# Patient Record
Sex: MALE | Race: WHITE | NOT HISPANIC OR LATINO | URBAN - METROPOLITAN AREA
[De-identification: names, ages, dates, MRNs, and addresses within clinical notes are randomized per-mention and may not be internally consistent; named-entity substitution may affect disease eponyms.]

---

## 2017-09-11 ENCOUNTER — FOLLOW UP ESTABLISHED (OUTPATIENT)
Dept: URBAN - METROPOLITAN AREA CLINIC 64 | Facility: CLINIC | Age: 47
End: 2017-09-11
Payer: COMMERCIAL

## 2017-09-11 DIAGNOSIS — H52.13 MYOPIA, BILATERAL: Primary | ICD-10-CM

## 2017-09-11 PROCEDURE — 92014 COMPRE OPH EXAM EST PT 1/>: CPT | Performed by: OPTOMETRIST

## 2017-09-11 PROCEDURE — 92015 DETERMINE REFRACTIVE STATE: CPT | Performed by: OPTOMETRIST

## 2017-09-11 ASSESSMENT — INTRAOCULAR PRESSURE
OS: 18
OD: 16

## 2017-09-11 ASSESSMENT — KERATOMETRY
OD: 43.19
OS: 43.38

## 2017-09-11 ASSESSMENT — VISUAL ACUITY
OS: 20/20
OD: 20/20

## 2019-01-29 ENCOUNTER — FOLLOW UP ESTABLISHED (OUTPATIENT)
Dept: URBAN - METROPOLITAN AREA CLINIC 64 | Facility: CLINIC | Age: 49
End: 2019-01-29
Payer: COMMERCIAL

## 2019-01-29 PROCEDURE — 92014 COMPRE OPH EXAM EST PT 1/>: CPT | Performed by: OPTOMETRIST

## 2019-01-29 PROCEDURE — 92015 DETERMINE REFRACTIVE STATE: CPT | Performed by: OPTOMETRIST

## 2019-01-29 ASSESSMENT — KERATOMETRY
OS: 43.35
OD: 43.33

## 2019-01-29 ASSESSMENT — VISUAL ACUITY
OD: 20/20
OS: 20/15

## 2019-01-29 ASSESSMENT — INTRAOCULAR PRESSURE
OS: 16
OD: 15

## 2022-04-05 ENCOUNTER — OFFICE VISIT (OUTPATIENT)
Dept: URBAN - METROPOLITAN AREA CLINIC 64 | Facility: CLINIC | Age: 52
End: 2022-04-05
Payer: COMMERCIAL

## 2022-04-05 DIAGNOSIS — H52.03 HYPERMETROPIA, BILATERAL: Primary | ICD-10-CM

## 2022-04-05 DIAGNOSIS — H18.593 OTHER HEREDITARY CORNEAL DYSTROPHIES: ICD-10-CM

## 2022-04-05 PROCEDURE — 92004 COMPRE OPH EXAM NEW PT 1/>: CPT | Performed by: OPTOMETRIST

## 2022-04-05 ASSESSMENT — KERATOMETRY
OD: 43.08
OS: 43.30

## 2022-04-05 ASSESSMENT — INTRAOCULAR PRESSURE
OD: 16
OS: 17

## 2022-04-05 ASSESSMENT — VISUAL ACUITY
OD: 20/20
OS: 20/20

## 2022-04-05 NOTE — IMPRESSION/PLAN
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 Plan: Trace EBMD OD. Hx of recent epiphora OD. Recommend he try OTC tears qid OD. RTC if epiphora persists and we can do D/I. Hx of trichiasis eyelid sx OD. No trichiasis today.

## 2023-04-06 ENCOUNTER — OFFICE VISIT (OUTPATIENT)
Dept: URBAN - METROPOLITAN AREA CLINIC 64 | Facility: CLINIC | Age: 53
End: 2023-04-06
Payer: COMMERCIAL

## 2023-04-06 DIAGNOSIS — H52.03 HYPERMETROPIA, BILATERAL: Primary | ICD-10-CM

## 2023-04-06 DIAGNOSIS — H25.13 AGE-RELATED NUCLEAR CATARACT, BILATERAL: ICD-10-CM

## 2023-04-06 PROCEDURE — 92014 COMPRE OPH EXAM EST PT 1/>: CPT | Performed by: OPTOMETRIST

## 2023-04-06 ASSESSMENT — KERATOMETRY
OS: 43.60
OD: 43.44

## 2023-04-06 ASSESSMENT — INTRAOCULAR PRESSURE
OD: 13
OS: 16

## 2023-04-06 ASSESSMENT — VISUAL ACUITY
OD: 20/20
OS: 20/20

## 2023-04-06 NOTE — IMPRESSION/PLAN
Impression: Age-related nuclear cataract, bilateral: H25.13. Plan: Trace OU. No treatment currently recommended due to level of vision. Patient will monitor vision changes and contact us with any decrease in vision, will re-evaluate cataract on next visit.

## 2024-04-04 ENCOUNTER — OFFICE VISIT (OUTPATIENT)
Dept: URBAN - METROPOLITAN AREA CLINIC 64 | Facility: LOCATION | Age: 54
End: 2024-04-04
Payer: COMMERCIAL

## 2024-04-04 DIAGNOSIS — H52.03 HYPERMETROPIA, BILATERAL: Primary | ICD-10-CM

## 2024-04-04 PROCEDURE — 92014 COMPRE OPH EXAM EST PT 1/>: CPT | Performed by: OPTOMETRIST

## 2024-04-04 ASSESSMENT — VISUAL ACUITY
OD: 20/20
OS: 20/20

## 2024-04-04 ASSESSMENT — INTRAOCULAR PRESSURE
OS: 18
OD: 19
OS: 25
OD: 20